# Patient Record
(demographics unavailable — no encounter records)

---

## 2025-02-21 NOTE — ASSESSMENT
[Bisphosphonate Therapy] : Risks and benefits of bisphosphonate therapy were  discussed with the patient including gastroesophageal irritation, osteonecrosis of the jaw, and atypical femur fractures, and acute phase reaction [Bisphosphonates] : The patient was instructed to take bisphosphonates on an empty stomach with a full glass of water,and wait at least 30 minutes before eating or lying down [FreeTextEntry1] : 66  year-old female with osteoporosis  She had been on Fosamax in the distant (2004 -2009) past but when she retried Fosamax she did not tolerate. Began Prolia 2016. BMD 2017 was stable, but pt believed rx caused myalgias and increased chol, although it was still normal. Pt transitioned to Boniva 2017. Took correctly, tolerated well. BMD 12/2018 indicated stable osteoporosis in the spine and total hip and stable osteopenia in the fem neck and proximal radius. BMD 12/2020 stable at all sites, osteopenia in proximal radius. Pt requested drug holiday, began 12/2020 . BMD 12/2021 indicated worsened osteoporosis in spine, stable osteoporosis in hip but trending worse in femoral neck, and stable osteopenia in proximal radius.  Pt began ibandronate 12/2021, taking correctly tolerating well .  BMD 2/2023 showed that there has been an increase in bone density in the femoral neck.  BMD 2025 stable, although low spine -3.0 Newer data on treat-to-target discussed.  Jemma F, Corazon EM, Marito R, Graham DE, Jurgen Hubbard S, Josephine B, Kirk Y, Jyoti GE, Jayson DP, Darrick MAKT, Mann JL, Arsenio AM, Gallo-Joseph A, Clifford P, Blanche S, Valeriano F, Vanessa W, Megan SR. Goal-directed osteoporosis treatment: ASBMR/BHOF task force position statement 2024. J Bone  Res. 2024 Sep 26;39(10):6406-5395.   Options of transition to anabolic therapy with PTH analogs or Evenity discussed in detail.     recommend Evenity Pt to decide  f/u TBD

## 2025-02-21 NOTE — ASSESSMENT
[Bisphosphonate Therapy] : Risks and benefits of bisphosphonate therapy were  discussed with the patient including gastroesophageal irritation, osteonecrosis of the jaw, and atypical femur fractures, and acute phase reaction [Bisphosphonates] : The patient was instructed to take bisphosphonates on an empty stomach with a full glass of water,and wait at least 30 minutes before eating or lying down [FreeTextEntry1] : 66  year-old female with osteoporosis  She had been on Fosamax in the distant (2004 -2009) past but when she retried Fosamax she did not tolerate. Began Prolia 2016. BMD 2017 was stable, but pt believed rx caused myalgias and increased chol, although it was still normal. Pt transitioned to Boniva 2017. Took correctly, tolerated well. BMD 12/2018 indicated stable osteoporosis in the spine and total hip and stable osteopenia in the fem neck and proximal radius. BMD 12/2020 stable at all sites, osteopenia in proximal radius. Pt requested drug holiday, began 12/2020 . BMD 12/2021 indicated worsened osteoporosis in spine, stable osteoporosis in hip but trending worse in femoral neck, and stable osteopenia in proximal radius.  Pt began ibandronate 12/2021, taking correctly tolerating well .  BMD 2/2023 showed that there has been an increase in bone density in the femoral neck.  BMD 2025 stable, although low spine -3.0 Newer data on treat-to-target discussed.  Jemma F, Corazon EM, Marito R, Graham IN, Jurgen Hubbard S, Josephine B, Kirk Y, Jyoti GE, Jayson DP, Darrick MAKT, Mann JL, Arsenio AM, Gallo-Joseph A, Clifford P, Blanche S, Valeriano F, Vanessa W, Megan SR. Goal-directed osteoporosis treatment: ASBMR/BHOF task force position statement 2024. J Bone  Res. 2024 Sep 26;39(10):1630-7762.   Options of transition to anabolic therapy with PTH analogs or Evenity discussed in detail.     recommend Evenity Pt to decide  f/u TBD

## 2025-02-21 NOTE — HISTORY OF PRESENT ILLNESS
[FreeTextEntry1] : Patient returns for a follow up visit for osteoporosis.   Since the last visit pt has no significant interval health changes. No interval surgery, hospitalizations, fractures, or change in medications.  Pt had been told of low bone density after chemotherapy for breast cancer approximately 2003. She had a relatively early menopause due to chemotherapy at age 45. Prev on Tamoxifen, Femara. She took Fosamax for 5 years from 2004 until 2009. A repeat bone density showed mildly low values. Spine -2.5, osteoporosis but approximate 5% decrease vs prior studies. Hip values were similar. Retried Fosamax, didn't tolerate. Began Prolia 5/2016. No interval fx. Pt feels like Prolia caused myalgias and increased cholesterol even though still normal, so she switched to monthly Boniva 12/2017. Took correctly, tolerated well. BMD 12/2018 indicated stable osteoporosis in the spine and total hip and stable osteopenia in the fem neck and proximal radius. BMD 12/2020 stable at all sites, osteopenia in proximal radius.  Pt requested drug holiday, began 12/2020.Pt began ibandronate 12/2021, taking correctly tolerating well. She missed the one dose previously b/c she ran out of it.  BMD 2/2023 showed that there has been an increase in bone density in the femoral neck.

## 2025-02-21 NOTE — PROCEDURE
[FreeTextEntry1] : Bone mineral density: 02/21/2025 indication: Comparison to 2023 Spine -3.0 osteoporosis no significant change Total hip -2.8 osteoporosis no significant change Femoral neck -2.4 osteopenia no significant change Proximal radius -2.3 osteopenia no significant change   Bone Density 2/3/2023 Indication vs 2021 Asses response to medication Spine -3.0 osteoporosis , no significant change  Total Hip -2.7 osteoporosis  Femoral Neck -2.3 osteopenia +8.4%  Proximal Radius -2.2 osteopenia , no significant change   Bone mineral density: 12/21/2021  Indication: vs. 2020 assess response to medication Spine: -3.2 osteoporosis, -6.0% Total hip: -2.8 osteoporosis, no significant change Femoral neck: -2.7 osteoporosis, no significant change, decreased vs .2018 Proximal radius: -2.2 osteopenia, no significant change  Bone mineral density December 1, 2020 indication: Compared to 2018 spine -2.8 osteoporosis no significant change total hip -2.6 osteoporosis no significant change femoral neck -2.5 osteoporosis no significant change proximal radius -2.2 osteopenia no significant change  Thyroid US - 02/27/2020 PE indicates thyroid fullness, US showed no nodules, normal thyroid.  Bone mineral density: 12/17/2018  Indication: vs. 2017, assess response to medication  Spine: -2.6 osteoporosis, no significant change  Total hip: -2.6 osteoporosis, no significant change  Femoral neck: -2.3 osteopenia, no significant change  Proximal radius: -2.0 osteopenia, no significant change   Bone mineral density 5/23/17 Indication assess response to medication Spine -2.7, osteoporosis, no significant change versus 2015 Total hip -2.6, no significant change Femoral neck -2.4, osteopenia, no significant change Proximal radius -2.1, osteopenia, no prior

## 2025-02-21 NOTE — PHYSICAL EXAM
[Alert] : alert [Well Nourished] : well nourished [No Acute Distress] : no acute distress [Well Developed] : well developed [Normal Sclera/Conjunctiva] : normal sclera/conjunctiva [No Proptosis] : no proptosis [Thyroid Not Enlarged] : the thyroid was not enlarged [No Thyroid Nodules] : no palpable thyroid nodules [Clear to Auscultation] : lungs were clear to auscultation bilaterally [Normal S1, S2] : normal S1 and S2 [Normal Rate] : heart rate was normal [Regular Rhythm] : with a regular rhythm [No Edema] : no peripheral edema [Normal Bowel Sounds] : normal bowel sounds [Not Tender] : non-tender [Not Distended] : not distended [Soft] : abdomen soft [Normal Anterior Cervical Nodes] : no anterior cervical lymphadenopathy [No Spinal Tenderness] : no spinal tenderness [Spine Straight] : spine straight [No Stigmata of Cushings Syndrome] : no stigmata of Cushings Syndrome [Normal Gait] : normal gait [Normal Reflexes] : deep tendon reflexes were 2+ and symmetric [No Tremors] : no tremors [Oriented x3] : oriented to person, place, and time

## 2025-02-27 NOTE — HISTORY OF PRESENT ILLNESS
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   She c/o vaginal swelling. She reports bulging pain between vagina and anus. Denies straining, abdominal and pelvic pain. She denies abn discharge or vaginitis sxs. No urinary complaints. She has normal BM, no bloody stool.   OBHx:  GynHx: Atrophy. No Hx of STI, fibroids, ovarian cysts, abnl paps or pelvic infections. PMH: Breast ca stage 1 dx'd at age 43 s/p chemo and mastectomy and breast saline implants-genetics neg, osteoporosis SHx:  L mastectomy , R mastectomy .  Meds: Fosamex All: PCN, codeine, biaxin Soc: No alcohol use. No T/D. Psych: denies FHx: mother w/ stroke @72  @84. Denies FHx of breast, ovarian, uterine, colon, pancreatic, or prostate cancer. PHQ9 = 0 [BreastSonogramDate] : 2015 [TextBox_25] : nml [PapSmeardate] : 02/24 [TextBox_31] : GURVINDER [BoneDensityDate] : 02/25 [TextBox_37] : osteoporosis wnl [ColonoscopyDate] : 2023

## 2025-02-27 NOTE — PLAN
[FreeTextEntry1] : 66 year old female pt presents for routine gyn exam: Breast and pelvic exam performed Pap/HPV conducted, pt to self pay 2/2 insurance Pt to schedule pelvic sono  F/u w/ Internist for screening laboratories Nutrition and exercise discussed    Uterine prolapse: D/w pt that vaginal swelling related to prolapse Uterine prolapse visualized on physical exam, grade 2 when laying down and grade 3 when standing up Discussed pessary insertion, R/B/A discussed and option of surgcially approaches briefly reviewed. Explained can be asymptomatic  Advised kegel exercises and pelvic floor PT for maintenance   H/o breast ca and b/l mastectomy: Rx given for breast MRI  Osteoporosis: C/w Fosamax--may swtich to Evenity Continue regular fu with Dr. Marin Recommend adequate dietary calcium intake (1200 mg/day) and vitamin D supplementation (600 IU/day <69 yo), & weight-bearing exercises (i.e. walking) for 30 min at least 3x weekly Advised pt to continue seeing an endocrinologist to manage osteoporosis  RTO in 1 year for annual or PRN

## 2025-02-27 NOTE — PHYSICAL EXAM
[Chaperone Present] : A chaperone was present in the examining room during all aspects of the physical examination [Appropriately responsive] : appropriately responsive [Alert] : alert [No Acute Distress] : no acute distress [No Lymphadenopathy] : no lymphadenopathy [Regular Rate Rhythm] : regular rate rhythm [No Murmurs] : no murmurs [Clear to Auscultation B/L] : clear to auscultation bilaterally [Soft] : soft [Non-tender] : non-tender [Non-distended] : non-distended [No HSM] : No HSM [No Lesions] : no lesions [No Mass] : no mass [Oriented x3] : oriented x3 [Examination Of The Breasts] : a normal appearance [] : implants [No Masses] : no breast masses were palpable [Labia Majora] : normal [Labia Minora] : normal [Uterine Prolapse] : uterine prolapse [Normal] : normal [Uterine Adnexae] : normal [FreeTextEntry2] :  Rohith panda) [FreeTextEntry6] : b/l implants [FreeTextEntry4] : grade 2-3 on standing. [FreeTextEntry9] : Guaiac test negative, no masses noted